# Patient Record
Sex: MALE | Race: BLACK OR AFRICAN AMERICAN
[De-identification: names, ages, dates, MRNs, and addresses within clinical notes are randomized per-mention and may not be internally consistent; named-entity substitution may affect disease eponyms.]

---

## 2017-09-24 ENCOUNTER — HOSPITAL ENCOUNTER (EMERGENCY)
Dept: HOSPITAL 92 - ERS | Age: 62
Discharge: HOME | End: 2017-09-24
Payer: COMMERCIAL

## 2017-09-24 DIAGNOSIS — E11.9: ICD-10-CM

## 2017-09-24 DIAGNOSIS — H54.61: Primary | ICD-10-CM

## 2017-09-24 DIAGNOSIS — G56.90: ICD-10-CM

## 2017-09-24 PROCEDURE — 99283 EMERGENCY DEPT VISIT LOW MDM: CPT

## 2017-10-19 ENCOUNTER — HOSPITAL ENCOUNTER (OUTPATIENT)
Dept: HOSPITAL 92 - SDC | Age: 62
Discharge: HOME | End: 2017-10-19
Attending: OPHTHALMOLOGY
Payer: COMMERCIAL

## 2017-10-19 DIAGNOSIS — H43.391: Primary | ICD-10-CM

## 2017-10-19 DIAGNOSIS — Z98.890: ICD-10-CM

## 2017-10-19 PROCEDURE — 08QE3ZZ REPAIR RIGHT RETINA, PERCUTANEOUS APPROACH: ICD-10-PCS | Performed by: OPHTHALMOLOGY

## 2017-10-19 PROCEDURE — 36416 COLLJ CAPILLARY BLOOD SPEC: CPT

## 2017-10-19 PROCEDURE — 08T43ZZ RESECTION OF RIGHT VITREOUS, PERCUTANEOUS APPROACH: ICD-10-PCS | Performed by: OPHTHALMOLOGY

## 2019-07-18 ENCOUNTER — HOSPITAL ENCOUNTER (OUTPATIENT)
Dept: HOSPITAL 92 - SDC | Age: 64
Discharge: HOME | End: 2019-07-18
Attending: OPHTHALMOLOGY
Payer: MEDICARE

## 2019-07-18 VITALS — BODY MASS INDEX: 24.7 KG/M2

## 2019-07-18 DIAGNOSIS — H40.2213: Primary | ICD-10-CM

## 2019-07-18 PROCEDURE — 67255 REINFORCE/GRAFT EYE WALL: CPT

## 2019-07-18 PROCEDURE — 36416 COLLJ CAPILLARY BLOOD SPEC: CPT

## 2019-07-18 PROCEDURE — 67036 REMOVAL OF INNER EYE FLUID: CPT

## 2019-07-18 PROCEDURE — L8612 AQUEOUS SHUNT PROSTHESIS: HCPCS

## 2019-07-18 PROCEDURE — 82962 GLUCOSE BLOOD TEST: CPT

## 2019-07-18 PROCEDURE — 08T43ZZ RESECTION OF RIGHT VITREOUS, PERCUTANEOUS APPROACH: ICD-10-PCS | Performed by: OPHTHALMOLOGY

## 2019-07-18 NOTE — OP
DATE OF PROCEDURE:  07/18/2019



PREOPERATIVE DIAGNOSIS:  Glaucoma, right eye.



POSTOPERATIVE DIAGNOSIS:  Glaucoma, right eye.



PROCEDURE PERFORMED:  Pars plana vitrectomy and 2% scleral patch graft, right eye.



ANESTHESIA:  Local with monitored anesthesia care.



PROCEDURE IN DETAIL:  The patient was identified in the preoperative holding area.

Appropriate informed consent for the planned surgical procedure on the right eye had

been obtained.  The patient was transported to the operative suite, where

appropriate cardiopulmonary monitoring was established.  Local anesthesia obtained

using retrobulbar modified Van Lint lid block using 50:50 mixture of 4% lidocaine

and 0.75% bupivacaine.  The patient was prepped and draped in usual sterile manner

for ophthalmic surgery on the right eye.  Lid speculum was placed in the right eye.

A 25-gauge trocar was placed through the conjunctivae and sclerae superotemporally,

inferotemporally, and superonasally.  A 25-gauge trocar was placed through the

conjunctivae and sclerae superotemporally, inferotemporally, and superonasally.  An

FP7 tube shunt was placed superotemporally, fixated and placed with 5-0 Mersilene

sutures.  Tube was introduced into the sclerotomy superior temporally, 4 mm

posterior to the limbus.  Sclerotomy entry site was covered with a Tutoplast graft.

Conjunctival was suture closed with 6-0 plain gut suture.  Retrobulbar Kenalog and

subconjunctival Ancef were placed.  Antibiotic ointment was placed.  The eye was

patched and shielded.  The patient was taken to postoperative recovery unit in good

condition, having suffered no immediate perioperative complications.  The patient

was instructed to keep the patch and shield on, avoid lifting or bending.  Followup

appointment with Dr. Rios. 







Job ID:  [id number]